# Patient Record
Sex: MALE | Race: WHITE | NOT HISPANIC OR LATINO | Employment: OTHER | URBAN - METROPOLITAN AREA
[De-identification: names, ages, dates, MRNs, and addresses within clinical notes are randomized per-mention and may not be internally consistent; named-entity substitution may affect disease eponyms.]

---

## 2022-02-04 ENCOUNTER — NEW PATIENT (OUTPATIENT)
Dept: URBAN - METROPOLITAN AREA CLINIC 35 | Facility: CLINIC | Age: 72
End: 2022-02-04

## 2022-02-04 DIAGNOSIS — H43.821: ICD-10-CM

## 2022-02-04 DIAGNOSIS — H52.03: ICD-10-CM

## 2022-02-04 DIAGNOSIS — H25.13: ICD-10-CM

## 2022-02-04 DIAGNOSIS — H43.813: ICD-10-CM

## 2022-02-04 PROCEDURE — 92004 COMPRE OPH EXAM NEW PT 1/>: CPT

## 2022-02-04 PROCEDURE — 92250 FUNDUS PHOTOGRAPHY W/I&R: CPT

## 2022-02-04 PROCEDURE — 92015 DETERMINE REFRACTIVE STATE: CPT

## 2022-02-04 ASSESSMENT — VISUAL ACUITY
OD_CC: J10
OU_CC: J4
OS_SC: 20/400-
OS_BAT: 20/60
OS_CC: 20/40
OU_SC: 20/400+1
OD_SC: CF 5FT
OU_CC: 20/30+1
OD_BAT: 20/60
OD_CC: 20/40
OS_CC: J12

## 2022-02-04 ASSESSMENT — TONOMETRY
OS_IOP_MMHG: 15
OD_IOP_MMHG: 15

## 2022-03-01 ENCOUNTER — CONSULTATION/EVALUATION (OUTPATIENT)
Dept: URBAN - METROPOLITAN AREA CLINIC 35 | Facility: CLINIC | Age: 72
End: 2022-03-01

## 2022-03-01 DIAGNOSIS — Z98.890: ICD-10-CM

## 2022-03-01 DIAGNOSIS — H43.821: ICD-10-CM

## 2022-03-01 DIAGNOSIS — H25.811: ICD-10-CM

## 2022-03-01 DIAGNOSIS — H25.812: ICD-10-CM

## 2022-03-01 DIAGNOSIS — H43.813: ICD-10-CM

## 2022-03-01 PROCEDURE — V2799PMN IMPRIMIS PRED-MOXI-NEPAF 5ML

## 2022-03-01 PROCEDURE — 92136TC INTERFEROMETRY - TECHNICAL COMPONENT

## 2022-03-01 PROCEDURE — 92014 COMPRE OPH EXAM EST PT 1/>: CPT

## 2022-03-01 ASSESSMENT — VISUAL ACUITY
OS_CC: J7
OS_CC: 20/60+2
OD_CC: 20/50-1
OD_SC: CF 5FT
OD_CC: J6
OS_SC: CF 5FT

## 2022-03-01 ASSESSMENT — TONOMETRY
OD_IOP_MMHG: 14
OS_IOP_MMHG: 15

## 2022-03-01 NOTE — PATIENT DISCUSSION
Pt. elects [facility].  Pt. understands and accepts the need for full time correction after surgery. Discussed with the patient the predictability of visual outcome after cataract surgery is more difficult in previous refractive surgery patients. The patient is at greater risk for the need of enhancement to achieve desired visual outcome.

## 2022-03-10 ENCOUNTER — SURGERY/PROCEDURE (OUTPATIENT)
Dept: URBAN - METROPOLITAN AREA CLINIC 35 | Facility: CLINIC | Age: 72
End: 2022-03-10

## 2022-03-10 DIAGNOSIS — H25.812: ICD-10-CM

## 2022-03-10 PROCEDURE — 66984 XCAPSL CTRC RMVL W/O ECP: CPT

## 2022-03-11 ENCOUNTER — POST-OP (OUTPATIENT)
Dept: URBAN - METROPOLITAN AREA CLINIC 35 | Facility: CLINIC | Age: 72
End: 2022-03-11

## 2022-03-11 DIAGNOSIS — Z96.1: ICD-10-CM

## 2022-03-11 ASSESSMENT — TONOMETRY: OS_IOP_MMHG: 14

## 2022-03-11 ASSESSMENT — VISUAL ACUITY
OS_SC: 20/40
OS_PH: 20/30

## 2022-03-16 ENCOUNTER — POST OP/EVAL OF SECOND EYE (OUTPATIENT)
Dept: URBAN - METROPOLITAN AREA CLINIC 39 | Facility: CLINIC | Age: 72
End: 2022-03-16

## 2022-03-16 ENCOUNTER — SURGERY/PROCEDURE (OUTPATIENT)
Dept: URBAN - METROPOLITAN AREA CLINIC 35 | Facility: CLINIC | Age: 72
End: 2022-03-16

## 2022-03-16 DIAGNOSIS — H25.811: ICD-10-CM

## 2022-03-16 DIAGNOSIS — H25.812: ICD-10-CM

## 2022-03-16 DIAGNOSIS — Z96.1: ICD-10-CM

## 2022-03-16 PROCEDURE — 66984 XCAPSL CTRC RMVL W/O ECP: CPT

## 2022-03-16 PROCEDURE — 99212 OFFICE O/P EST SF 10 MIN: CPT

## 2022-03-16 ASSESSMENT — VISUAL ACUITY
OS_SC: 20/30
OD_SC: CF 5FT
OD_BAT: 20/60

## 2022-03-16 ASSESSMENT — TONOMETRY
OS_IOP_MMHG: 11
OD_IOP_MMHG: 12

## 2022-03-16 NOTE — PATIENT DISCUSSION
The types of intraocular lenses were reviewed with the patient along with a discussion of their various strengths and weaknesses. Patient [facility].

## 2022-03-17 ENCOUNTER — POST-OP (OUTPATIENT)
Dept: URBAN - METROPOLITAN AREA CLINIC 35 | Facility: CLINIC | Age: 72
End: 2022-03-17

## 2022-03-17 DIAGNOSIS — Z96.1: ICD-10-CM

## 2022-03-17 PROCEDURE — 99024 POSTOP FOLLOW-UP VISIT: CPT

## 2022-03-17 ASSESSMENT — VISUAL ACUITY
OD_SC: J12
OS_SC: 20/40-1
OU_SC: 20/30
OD_SC: 20/40
OU_SC: J2
OS_SC: J4

## 2022-03-17 ASSESSMENT — TONOMETRY
OS_IOP_MMHG: 16
OD_IOP_MMHG: 16

## 2022-04-05 ENCOUNTER — POST-OP (OUTPATIENT)
Dept: URBAN - METROPOLITAN AREA CLINIC 35 | Facility: CLINIC | Age: 72
End: 2022-04-05

## 2022-04-05 DIAGNOSIS — Z96.1: ICD-10-CM

## 2022-04-05 PROCEDURE — 99024 POSTOP FOLLOW-UP VISIT: CPT

## 2022-04-05 ASSESSMENT — VISUAL ACUITY
OS_SC: 20/50-2
OD_SC: 20/60
OU_SC: J4
OS_SC: J6
OS_PH: 20/30-1
OD_PH: 20/40+2
OD_SC: J10
OU_SC: 20/50-2

## 2022-04-05 ASSESSMENT — TONOMETRY
OS_IOP_MMHG: 16
OD_IOP_MMHG: 16